# Patient Record
Sex: FEMALE | Race: BLACK OR AFRICAN AMERICAN | Employment: UNEMPLOYED | URBAN - METROPOLITAN AREA
[De-identification: names, ages, dates, MRNs, and addresses within clinical notes are randomized per-mention and may not be internally consistent; named-entity substitution may affect disease eponyms.]

---

## 2017-12-04 ENCOUNTER — OFFICE VISIT (OUTPATIENT)
Dept: FAMILY MEDICINE CLINIC | Age: 52
End: 2017-12-04

## 2017-12-04 VITALS
TEMPERATURE: 97.9 F | DIASTOLIC BLOOD PRESSURE: 83 MMHG | SYSTOLIC BLOOD PRESSURE: 132 MMHG | HEIGHT: 70 IN | OXYGEN SATURATION: 99 % | RESPIRATION RATE: 16 BRPM | BODY MASS INDEX: 32.61 KG/M2 | HEART RATE: 80 BPM | WEIGHT: 227.8 LBS

## 2017-12-04 DIAGNOSIS — E66.9 OBESITY (BMI 30-39.9): ICD-10-CM

## 2017-12-04 DIAGNOSIS — Z13.0 SCREENING FOR ENDOCRINE, METABOLIC AND IMMUNITY DISORDER: ICD-10-CM

## 2017-12-04 DIAGNOSIS — Z12.11 SCREEN FOR COLON CANCER: ICD-10-CM

## 2017-12-04 DIAGNOSIS — Z13.29 SCREENING FOR ENDOCRINE, METABOLIC AND IMMUNITY DISORDER: ICD-10-CM

## 2017-12-04 DIAGNOSIS — K21.9 GASTROESOPHAGEAL REFLUX DISEASE WITHOUT ESOPHAGITIS: ICD-10-CM

## 2017-12-04 DIAGNOSIS — Z13.228 SCREENING FOR ENDOCRINE, METABOLIC AND IMMUNITY DISORDER: ICD-10-CM

## 2017-12-04 DIAGNOSIS — F51.01 PRIMARY INSOMNIA: ICD-10-CM

## 2017-12-04 DIAGNOSIS — D50.0 IRON DEFICIENCY ANEMIA DUE TO CHRONIC BLOOD LOSS: Primary | ICD-10-CM

## 2017-12-04 RX ORDER — ESOMEPRAZOLE MAGNESIUM 20 MG/1
20 TABLET, DELAYED RELEASE ORAL DAILY
Qty: 30 TAB | Refills: 3 | Status: SHIPPED | OUTPATIENT
Start: 2017-12-04

## 2017-12-04 NOTE — LETTER
12/8/2017 3:58 PM 
 
Ms. Akua Morrissey Lisa [address] [facility] [address] Dear Jose Stubbs: 
 
Please find your most recent results below. Resulted Orders CBC W/O DIFF Result Value Ref Range WBC 9.9 3.4 - 10.8 x10E3/uL  
 RBC 4.77 3.77 - 5.28 x10E6/uL HGB 9.7 (L) 11.1 - 15.9 g/dL Comment: **Please note reference interval change** HCT 33.0 (L) 34.0 - 46.6 % MCV 69 (L) 79 - 97 fL  
 MCH 20.3 (L) 26.6 - 33.0 pg  
 MCHC 29.4 (L) 31.5 - 35.7 g/dL  
 RDW 19.5 (H) 12.3 - 15.4 % PLATELET 465 (H) 752 - 379 x10E3/uL Narrative Performed at:  [address]  [id number] : Ramos Alexander MD, Phone:  [phone] METABOLIC PANEL, COMPREHENSIVE Result Value Ref Range Glucose 84 65 - 99 mg/dL BUN 14 6 - 24 mg/dL Creatinine 0.69 0.57 - 1.00 mg/dL GFR est non- >59 mL/min/1.73 GFR est  >59 mL/min/1.73  
 BUN/Creatinine ratio 20 9 - 23 Sodium 140 134 - 144 mmol/L Potassium 4.4 3.5 - 5.2 mmol/L Chloride 104 96 - 106 mmol/L  
 CO2 21 18 - 29 mmol/L Calcium 9.4 8.7 - 10.2 mg/dL Protein, total 7.9 6.0 - 8.5 g/dL Albumin 4.5 3.5 - 5.5 g/dL GLOBULIN, TOTAL 3.4 1.5 - 4.5 g/dL A-G Ratio 1.3 1.2 - 2.2 Bilirubin, total <0.2 0.0 - 1.2 mg/dL Alk. phosphatase 139 (H) 39 - 117 IU/L  
 AST (SGOT) 14 0 - 40 IU/L  
 ALT (SGPT) 15 0 - 32 IU/L Narrative Performed at:  [address]  [id number] : Ramos Alexander MD, Phone:  [phone] LIPID PANEL Result Value Ref Range Cholesterol, total 242 (H) 100 - 199 mg/dL Triglyceride 166 (H) 0 - 149 mg/dL HDL Cholesterol 43 >39 mg/dL VLDL, calculated 33 5 - 40 mg/dL LDL, calculated 166 (H) 0 - 99 mg/dL Narrative Performed at:  [address]  [id number] : Ramos Alexander MD, Phone:  [phone] TSH AND FREE T4 Result Value Ref Range TSH 1.210 0.450 - 4.500 uIU/mL T4, Free 1.04 0.82 - 1.77 ng/dL Narrative Performed at:  [address]  [id number] : Darletta Dancer MD, Phone:  [phone] HEMOGLOBIN A1C W/O EAG Result Value Ref Range Hemoglobin A1c 5.5 4.8 - 5.6 % Comment:  
            Pre-diabetes: 5.7 - 6.4 Diabetes: >6.4 Glycemic control for adults with diabetes: <7.0 Narrative Performed at:  [address]  [id number] : Darletta Dancer MD, Phone:  [phone] IRON PROFILE Result Value Ref Range TIBC 474 (H) 250 - 450 ug/dL UIBC 448 (H) 131 - 425 ug/dL Iron 26 (L) 27 - 159 ug/dL Iron % saturation 5 (LL) 15 - 55 % Narrative Performed at:  [address]  [id number] : Darletta Dancer MD, Phone:  [phone] CVD REPORT Result Value Ref Range INTERPRETATION Note Comment:  
   Supplemental report is available. Narrative Performed at:  [address] [place] [address]  [id number] : Garret Keith PhD, Phone:  [phone] Patient's blood cells indicate she has iron deficiency anemia. She needs to follow up with hematology to discuss iron infusions. Patient still needs to follow up with Gastro. Thyroid levels, glucose, liver and kidneys were normal. The test for diabetes (Hemoglobin A1c) was normal.  
Total cholesterol, triglycerides and LDL (bad cholesterol) were elevated. I recommend a low fat diet (avoiding fried and fast food, red meat, etc) as well as regular exercise. I recommend rechecking your cholesterol in 6 months Please call me if you have any questions: [phone] Sincerely, Ernie Asif, NP

## 2017-12-04 NOTE — MR AVS SNAPSHOT
Visit Information Date & Time Provider Department Dept. Phone Encounter #  
 12/4/2017 11:00 AM Merle Henley  [facility] [phone] [id number] Follow-up Instructions Return in about 6 months (around 6/4/2018). Upcoming Health Maintenance Date Due DTaP/Tdap/Td series (1 - Tdap) 7/4/1988 FOBT Q 1 YEAR AGE 50-75 7/4/2017 BREAST CANCER SCRN MAMMOGRAM 4/11/2018* PAP AKA CERVICAL CYTOLOGY 5/16/2018* *Topic was postponed. The date shown is not the original due date. Allergies as of 12/4/2017  Review Complete On: 12/4/2017 By: Melody Mohr LPN Severity Noted Reaction Type Reactions Midol Menstrual Headache [Acetaminophen-caffeine] High 12/04/2017   Side Effect Swelling Shellfish Derived Medium 12/04/2017   Systemic Itching Current Immunizations  Never Reviewed No immunizations on file. Not reviewed this visit You Were Diagnosed With   
  
 Codes Comments Iron deficiency anemia due to chronic blood loss    -  Primary ICD-10-CM: D50.0 ICD-9-CM: 280.0 Gastroesophageal reflux disease without esophagitis     ICD-10-CM: K21.9 ICD-9-CM: 530.81 Obesity (BMI 30-39. 9)     ICD-10-CM: E66.9 ICD-9-CM: 278.00 Screening for endocrine, metabolic and immunity disorder     ICD-10-CM: Z13.29, Z13.228, Z13.0 ICD-9-CM: V77.99 Vitals BP Pulse Temp Resp Height(growth percentile) Weight(growth percentile) 132/83 (BP 1 Location: Right arm, BP Patient Position: Sitting) 80 97.9 °F (36.6 °C) (Oral) 16 5' 10\" (1.778 m) 227 lb 12.8 oz (103.3 kg) LMP SpO2 BMI OB Status Smoking Status 11/26/2017 (Approximate) 99% 32.69 kg/m2 Having regular periods Never Smoker Vitals History BMI and BSA Data Body Mass Index Body Surface Area  
 32.69 kg/m 2 2.26 m 2 Preferred Pharmacy Pharmacy Name Phone  [address] AT [address] [place] [address] [address] [facility] [address] [address] [phone] Your Updated Medication List  
  
   
This list is accurate as of: 12/4/17 11:32 AM.  Always use your most recent med list.  
  
  
  
  
 esomeprazole magnesium 20 mg Tbec Commonly known as:  NexIUM 24HR Take 20 mg by mouth daily. For acid reflux Prescriptions Sent to Pharmacy Refills  
 esomeprazole magnesium (NEXIUM 24HR) 20 mg TbEC 3 Sig: Take 20 mg by mouth daily. For acid reflux Class: Normal  
 Pharmacy: [facility] [address] [place] [address], [address] [facility] of [address] [place] [address] Ph #: [phone] Route: Oral  
  
We Performed the Following CBC W/O DIFF [05490 CPT(R)] HEMOGLOBIN A1C W/O EAG [59245 CPT(R)] IRON PROFILE [id number] CPT(R)] LIPID PANEL [00320 CPT(R)] METABOLIC PANEL, COMPREHENSIVE [09463 CPT(R)] TSH AND FREE T4 [88770 CPT(R)] Follow-up Instructions Return in about 6 months (around 6/4/2018). Introducing [facility]! Olimpia Vaughan introduces [employer] patient portal. Now you can access parts of your medical record, email your doctor's office, and request medication refills online. 1. In your internet browser, go to [email or web address] 2. Click on the First Time User? Click Here link in the Sign In box. You will see the New Member Sign Up page. 3. Enter your [employer] Access Code exactly as it appears below. You will not need to use this code after youve completed the sign-up process. If you do not sign up before the expiration date, you must request a new code. · [employer] Access Code: [email or web address] Expires: 3/4/2018 11:32 AM 
 
4. Enter the last four digits of your Social Security Number (xxxx) and Date of Birth (mm/dd/yyyy) as indicated and click Submit. You will be taken to the next sign-up page. 5. Create a [employer] ID.  This will be your [employer] login ID and cannot be changed, so think of one that is secure and easy to remember. 6. Create a LookUP password. You can change your password at any time. 7. Enter your Password Reset Question and Answer. This can be used at a later time if you forget your password. 8. Enter your e-mail address. You will receive e-mail notification when new information is available in [address] 9. Click Sign Up. You can now view and download portions of your medical record. 10. Click the Download Summary menu link to download a portable copy of your medical information. If you have questions, please visit the Frequently Asked Questions section of the LookUP website. Remember, LookUP is NOT to be used for urgent needs. For medical emergencies, dial 911. Now available from your iPhone and Android! Please provide this summary of care documentation to your next provider. Your primary care clinician is listed as Amada Painter. If you have any questions after today's visit, please call [phone].

## 2017-12-04 NOTE — PROGRESS NOTES
Salbador Antonio is a 50 y.o. female who was seen in clinic today (12/4/2017). Subjective:  Cardiovascular Review:  The patient has no known cardiovascular conditions. Diet and Lifestyle: generally follows a low fat low cholesterol diet, generally follows a low sodium diet, exercises sporadically, nonsmoker  Home BP Monitoring: is not measured at home. Pertinent ROS: no TIA's, no chest pain on exertion, no dyspnea on exertion, no swelling of ankles. Patient seen by hematology for iron deficiency anemia s/t menorrhagia. Taking iron supplement daily. Patient seen by GYN routinely. Nausea / Vomiting  Patient complains of nausea without vomiting. Onset of symptoms was 1 month ago. Patient describes nausea as moderate. Insominia  Patient reports presents with difficulty sleeping. Onset: several years ago. Description of symtoms:  frequent awakening. Associated symptoms: hot flashes. Denies: depression, anxiety and ETOH overuse. Treatment to date: none. Prior to Admission medications    Medication Sig Start Date End Date Taking? Authorizing Provider   esomeprazole magnesium (NEXIUM 24HR) 20 mg TbEC Take 20 mg by mouth daily. For acid reflux 12/4/17  Yes Merle Henley NP          Allergies   Allergen Reactions    Midol Menstrual Headache [Acetaminophen-Caffeine] Swelling    Shellfish Derived Itching           ROS  See HPI    Objective:   Physical Exam   Constitutional: She is oriented to person, place, and time. She appears well-developed and well-nourished. Neck: Normal range of motion. Neck supple. No JVD present. Carotid bruit is not present. No thyromegaly present. Cardiovascular: Normal rate, regular rhythm and intact distal pulses. Exam reveals no gallop and no friction rub. No murmur heard. Pulmonary/Chest: Effort normal and breath sounds normal. No respiratory distress. Musculoskeletal: She exhibits no edema. Lymphadenopathy:     She has no cervical adenopathy. Neurological: She is alert and oriented to person, place, and time. Psychiatric: She has a normal mood and affect. Her behavior is normal.   Nursing note and vitals reviewed. Visit Vitals    /83 (BP 1 Location: Right arm, BP Patient Position: Sitting)    Pulse 80    Temp 97.9 °F (36.6 °C) (Oral)    Resp 16    Ht 5' 10\" (1.778 m)    Wt 227 lb 12.8 oz (103.3 kg)    LMP 11/26/2017 (Approximate)    SpO2 99%    BMI 32.69 kg/m2       Assessment & Plan:  Diagnoses and all orders for this visit:    1. Iron deficiency anemia due to chronic blood loss  Recheck labs  -     CBC W/O DIFF  -     IRON PROFILE    2. Gastroesophageal reflux disease without esophagitis  -     Begin esomeprazole magnesium (NEXIUM 24HR) 20 mg TbEC; Take 20 mg by mouth daily. For acid reflux    3. Obesity (BMI 30-39. 9)  Discussed need for weight loss through diet and exercise. Reviewed decreased caloric intake and increased activity. 4. Primary insomnia  Melatonin PRN    5. Screening for endocrine, metabolic and immunity disorder  -     CBC W/O DIFF  -     METABOLIC PANEL, COMPREHENSIVE  -     LIPID PANEL  -     TSH AND FREE T4  -     HEMOGLOBIN A1C W/O EAG  -     IRON PROFILE    6. Screen for colon cancer  Discussed need for colonoscopy as a screening for colon cancer.   -     REFERRAL TO GASTROENTEROLOGY      I have discussed the diagnosis with the patient and the intended plan as seen in the above orders. The patient has received an after-visit summary along with patient information handout. I have discussed medication side effects and warnings with the patient as well. Follow-up Disposition:  Return in about 6 months (around 6/4/2018).         Manisha Rodriguez NP

## 2017-12-04 NOTE — PROGRESS NOTES
Chief Complaint   Patient presents with    New Patient    Nausea     on and off for about a month    Weight Gain     patient states she has concerns about her weight gain over the past 2 months       1. Have you been to the ER, urgent care clinic since your last visit? Hospitalized since your last visit? No    2. Have you seen or consulted any other health care providers outside of the [address] [place] [address] since your last visit? Include any pap smears or colon screening.  No

## 2017-12-05 LAB
ALBUMIN SERPL-MCNC: 4.5 G/DL (ref 3.5–5.5)
ALBUMIN/GLOB SERPL: 1.3 {RATIO} (ref 1.2–2.2)
ALP SERPL-CCNC: 139 IU/L (ref 39–117)
ALT SERPL-CCNC: 15 IU/L (ref 0–32)
AST SERPL-CCNC: 14 IU/L (ref 0–40)
BILIRUB SERPL-MCNC: <0.2 MG/DL (ref 0–1.2)
BUN SERPL-MCNC: 14 MG/DL (ref 6–24)
BUN/CREAT SERPL: 20 (ref 9–23)
CALCIUM SERPL-MCNC: 9.4 MG/DL (ref 8.7–10.2)
CHLORIDE SERPL-SCNC: 104 MMOL/L (ref 96–106)
CHOLEST SERPL-MCNC: 242 MG/DL (ref 100–199)
CO2 SERPL-SCNC: 21 MMOL/L (ref 18–29)
CREAT SERPL-MCNC: 0.69 MG/DL (ref 0.57–1)
ERYTHROCYTE [DISTWIDTH] IN BLOOD BY AUTOMATED COUNT: 19.5 % (ref 12.3–15.4)
GFR SERPLBLD CREATININE-BSD FMLA CKD-EPI: 102 ML/MIN/1.73
GFR SERPLBLD CREATININE-BSD FMLA CKD-EPI: 117 ML/MIN/1.73
GLOBULIN SER CALC-MCNC: 3.4 G/DL (ref 1.5–4.5)
GLUCOSE SERPL-MCNC: 84 MG/DL (ref 65–99)
HBA1C MFR BLD: 5.5 % (ref 4.8–5.6)
HCT VFR BLD AUTO: 33 % (ref 34–46.6)
HDLC SERPL-MCNC: 43 MG/DL
HGB BLD-MCNC: 9.7 G/DL (ref 11.1–15.9)
INTERPRETATION, 910389: NORMAL
IRON SATN MFR SERPL: 5 % (ref 15–55)
IRON SERPL-MCNC: 26 UG/DL (ref 27–159)
LDLC SERPL CALC-MCNC: 166 MG/DL (ref 0–99)
MCH RBC QN AUTO: 20.3 PG (ref 26.6–33)
MCHC RBC AUTO-ENTMCNC: 29.4 G/DL (ref 31.5–35.7)
MCV RBC AUTO: 69 FL (ref 79–97)
PLATELET # BLD AUTO: 456 X10E3/UL (ref 150–379)
POTASSIUM SERPL-SCNC: 4.4 MMOL/L (ref 3.5–5.2)
PROT SERPL-MCNC: 7.9 G/DL (ref 6–8.5)
RBC # BLD AUTO: 4.77 X10E6/UL (ref 3.77–5.28)
SODIUM SERPL-SCNC: 140 MMOL/L (ref 134–144)
T4 FREE SERPL-MCNC: 1.04 NG/DL (ref 0.82–1.77)
TIBC SERPL-MCNC: 474 UG/DL (ref 250–450)
TRIGL SERPL-MCNC: 166 MG/DL (ref 0–149)
TSH SERPL DL<=0.005 MIU/L-ACNC: 1.21 UIU/ML (ref 0.45–4.5)
UIBC SERPL-MCNC: 448 UG/DL (ref 131–425)
VLDLC SERPL CALC-MCNC: 33 MG/DL (ref 5–40)
WBC # BLD AUTO: 9.9 X10E3/UL (ref 3.4–10.8)

## 2017-12-08 DIAGNOSIS — D50.9 IRON DEFICIENCY ANEMIA, UNSPECIFIED IRON DEFICIENCY ANEMIA TYPE: Primary | ICD-10-CM

## 2017-12-08 NOTE — PROGRESS NOTES
Correction: patient already taking iron supplement. She needs to follow up with hematology to discuss iron infusions.  Patient still needs to follow up with GI.

## 2017-12-08 NOTE — PROGRESS NOTES
Patient will call Concetta Latif to make appointment mail referral to her as well per patient request

## 2017-12-08 NOTE — PROGRESS NOTES
[phone] verified  Patient notified of above note and voiced understanding of what was read.   Patient requested labs to be mailed to her

## 2017-12-08 NOTE — PROGRESS NOTES
Patient's blood cells indicate she has iron deficiency anemia. She needs to start on an iron supplement once daily -this will be sent to her pharmacy. Also needs to follow up with GI to ensure she does not have a GI bleed. Thyroid levels, glucose, liver and kidneys were normal. The test for diabetes (Hemoglobin A1c) was normal.     Total cholesterol, triglycerides and LDL (bad cholesterol) were elevated. I recommend a low fat diet (avoiding fried and fast food, red meat, etc) as well as regular exercise. I recommend rechecking your cholesterol in 6 months.

## 2018-10-26 ENCOUNTER — OFFICE VISIT (OUTPATIENT)
Dept: FAMILY MEDICINE CLINIC | Age: 53
End: 2018-10-26

## 2018-10-26 VITALS
DIASTOLIC BLOOD PRESSURE: 80 MMHG | WEIGHT: 223 LBS | HEIGHT: 70 IN | BODY MASS INDEX: 31.92 KG/M2 | RESPIRATION RATE: 18 BRPM | OXYGEN SATURATION: 99 % | SYSTOLIC BLOOD PRESSURE: 133 MMHG | HEART RATE: 90 BPM | TEMPERATURE: 97.7 F

## 2018-10-26 DIAGNOSIS — E78.5 HYPERLIPIDEMIA, UNSPECIFIED HYPERLIPIDEMIA TYPE: ICD-10-CM

## 2018-10-26 DIAGNOSIS — E66.9 OBESITY (BMI 30-39.9): ICD-10-CM

## 2018-10-26 DIAGNOSIS — Z12.11 SCREEN FOR COLON CANCER: ICD-10-CM

## 2018-10-26 DIAGNOSIS — Z00.00 ROUTINE GENERAL MEDICAL EXAMINATION AT A HEALTH CARE FACILITY: Primary | ICD-10-CM

## 2018-10-26 DIAGNOSIS — N92.0 MENORRHAGIA WITH REGULAR CYCLE: ICD-10-CM

## 2018-10-26 DIAGNOSIS — D50.0 IRON DEFICIENCY ANEMIA DUE TO CHRONIC BLOOD LOSS: ICD-10-CM

## 2018-10-26 RX ORDER — LANOLIN ALCOHOL/MO/W.PET/CERES
CREAM (GRAM) TOPICAL
COMMUNITY

## 2018-10-26 NOTE — PATIENT INSTRUCTIONS
Abnormal Uterine Bleeding: Care Instructions  Your Care Instructions    Abnormal uterine bleeding (AUB) is irregular bleeding from the uterus that is longer or heavier than usual or does not occur at your regular time. Sometimes it is caused by changes in hormone levels. It can also be caused by growths in the uterus, such as fibroids or polyps. Sometimes a cause cannot be found. You may have heavy bleeding when you are not expecting your period. Your doctor may suggest a pregnancy test, if you think you are pregnant. Follow-up care is a key part of your treatment and safety. Be sure to make and go to all appointments, and call your doctor if you are having problems. It's also a good idea to know your test results and keep a list of the medicines you take. How can you care for yourself at home? · Be safe with medicines. Take pain medicines exactly as directed. ? If the doctor gave you a prescription medicine for pain, take it as prescribed. ? If you are not taking a prescription pain medicine, ask your doctor if you can take an over-the-counter medicine. · You may be low in iron because of blood loss. Eat a balanced diet that is high in iron and vitamin C. Foods rich in iron include red meat, shellfish, eggs, beans, and leafy green vegetables. Talk to your doctor about whether you need to take iron pills or a multivitamin. When should you call for help? Call 911 anytime you think you may need emergency care. For example, call if:    · You passed out (lost consciousness).    Call your doctor now or seek immediate medical care if:    · You have new or worse belly or pelvic pain.     · You have severe vaginal bleeding.     · You feel dizzy or lightheaded, or you feel like you may faint.    Watch closely for changes in your health, and be sure to contact your doctor if:    · You think you may be pregnant.     · Your bleeding gets worse.     · You do not get better as expected.    Where can you learn more?  Go to http://deo-deb.info/. Enter U040 in the search box to learn more about \"Abnormal Uterine Bleeding: Care Instructions. \"  Current as of: May 15, 2018  Content Version: 11.8  © 3554-2459 [employer]. Care instructions adapted under license by [employer] (which disclaims liability or warranty for this information). If you have questions about a medical condition or this instruction, always ask your healthcare professional. [address] any warranty or liability for your use of this information.

## 2018-10-26 NOTE — PROGRESS NOTES
Chief Complaint   Patient presents with    Establish Care    Complete Physical    Irregular Menses     Heavy flow and last longer     1. Have you been to the ER, urgent care clinic since your last visit? Hospitalized since your last visit? No    2. Have you seen or consulted any other health care providers outside of the [address] [place] [address] since your last visit? Include any pap smears or colon screening.  No

## 2018-10-26 NOTE — PROGRESS NOTES
[facility] Note    Zbigniew Castellano is a 48 y.o. female who was seen in clinic today (10/26/2018). Subjective:  Cardiovascular Review:  The patient has obesity and hyperlipidemia. Diet and Lifestyle: generally follows a low fat low cholesterol diet, generally follows a low sodium diet, sedentary, nonsmoker  Home BP Monitoring: is not measured at home. Pertinent ROS: no TIA's, no chest pain on exertion, no dyspnea on exertion, no swelling of ankles. Menorrhagia  Patient complains of irregular menses. She had been bleeding regularly. Menses are lasting up to 8-9 days. Pad or tampon count: changes every 2 hours. May experience clots  Dysmenorrhea: none. She denies h/o fibroid tumors. Previously seen by [facility]. Taking iron supplement every other day - reports constipation for medication. Prior to Admission medications    Medication Sig Start Date End Date Taking? Authorizing Provider   ferrous sulfate (IRON) 325 mg (65 mg iron) tablet Take  by mouth Daily (before breakfast). Yes Provider, Historical   esomeprazole magnesium (NEXIUM 24HR) 20 mg TbEC Take 20 mg by mouth daily. For acid reflux 12/4/17  Yes Jorge Barboza Sender, NP          Allergies   Allergen Reactions    Midol Menstrual Headache [Acetaminophen-Caffeine] Swelling    Shellfish Derived Itching           Review of Systems   Constitutional: Negative for malaise/fatigue and weight loss. HENT: Negative for hearing loss. Eyes: Negative for blurred vision. Respiratory: Negative for shortness of breath. Cardiovascular: Negative for chest pain, palpitations and leg swelling. Gastrointestinal: Negative for abdominal pain, constipation, diarrhea and heartburn. Genitourinary: Negative for frequency and urgency. Musculoskeletal: Negative for back pain, joint pain and myalgias. Neurological: Negative for dizziness, weakness and headaches.    Endo/Heme/Allergies: Does not bruise/bleed easily. Psychiatric/Behavioral: Negative for depression. Objective:   Physical Exam   Constitutional: She is oriented to person, place, and time. She appears well-developed and well-nourished. No distress. HENT:   Right Ear: Tympanic membrane and ear canal normal.   Left Ear: Tympanic membrane and ear canal normal.   Nose: No mucosal edema. Right sinus exhibits no maxillary sinus tenderness and no frontal sinus tenderness. Left sinus exhibits no maxillary sinus tenderness and no frontal sinus tenderness. Mouth/Throat: Oropharynx is clear and moist.   Eyes: Conjunctivae and EOM are normal. Pupils are equal, round, and reactive to light. Neck: Normal range of motion. Neck supple. No JVD present. Carotid bruit is not present. No thyromegaly present. Cardiovascular: Normal rate, regular rhythm, normal heart sounds and intact distal pulses. Exam reveals no gallop and no friction rub. No murmur heard. Pulmonary/Chest: Effort normal and breath sounds normal. No respiratory distress. She has no decreased breath sounds. She has no wheezes. She has no rhonchi. Abdominal: Soft. Bowel sounds are normal. She exhibits no distension. There is no tenderness. Musculoskeletal: She exhibits no edema. Lymphadenopathy:     She has no cervical adenopathy. Neurological: She is alert and oriented to person, place, and time. Psychiatric: She has a normal mood and affect. Her behavior is normal.   Nursing note and vitals reviewed. Visit Vitals  /80 (BP 1 Location: Left arm, BP Patient Position: Sitting)   Pulse 90   Temp 97.7 °F (36.5 °C) (Oral)   Resp 18   Ht 5' 10\" (1.778 m)   Wt 223 lb (101.2 kg)   LMP 09/26/2018 (Approximate)   SpO2 99%   BMI 32.00 kg/m²       Assessment & Plan:  Diagnoses and all orders for this visit:    1. Routine general medical examination at a health care facility    2. Iron deficiency anemia due to chronic blood loss  Recheck levels.  Referral to hematology for parental iron as needed. -     ANEMIA PROFILE B    3. Hyperlipidemia, unspecified hyperlipidemia type  Reviewed diet and lifestyle changes.  -     METABOLIC PANEL, COMPREHENSIVE  -     LIPID PANEL    4. Obesity (BMI 30-39. 9)  Discussed need for weight loss through diet and exercise. Reviewed decreased caloric intake and increased activity. 5. Menorrhagia with regular cycle  Likely had uterine fibroids. Follow up needed with GYN for definitive treatment plan. -     ANEMIA PROFILE B  -     REFERRAL TO OBSTETRICS AND GYNECOLOGY    6. Screen for colon cancer  Discussed need for colonoscopy as a screening for colon cancer.   -     REFERRAL TO GASTROENTEROLOGY      I have discussed the diagnosis with the patient and the intended plan as seen in the above orders. The patient has received an after-visit summary along with patient information handout. I have discussed medication side effects and warnings with the patient as well. Follow-up Disposition:  Return in about 6 months (around 4/26/2019) for disease management.         Diomedes Carl NP

## 2018-10-27 DIAGNOSIS — D50.0 IRON DEFICIENCY ANEMIA DUE TO CHRONIC BLOOD LOSS: Primary | ICD-10-CM

## 2018-10-27 LAB
ALBUMIN SERPL-MCNC: 4.3 G/DL (ref 3.5–5.5)
ALBUMIN/GLOB SERPL: 1.3 {RATIO} (ref 1.2–2.2)
ALP SERPL-CCNC: 175 IU/L (ref 39–117)
ALT SERPL-CCNC: 29 IU/L (ref 0–32)
AST SERPL-CCNC: 24 IU/L (ref 0–40)
BASOPHILS # BLD AUTO: 0 X10E3/UL (ref 0–0.2)
BASOPHILS NFR BLD AUTO: 0 %
BILIRUB SERPL-MCNC: 0.2 MG/DL (ref 0–1.2)
BUN SERPL-MCNC: 15 MG/DL (ref 6–24)
BUN/CREAT SERPL: 22 (ref 9–23)
CALCIUM SERPL-MCNC: 9.5 MG/DL (ref 8.7–10.2)
CHLORIDE SERPL-SCNC: 103 MMOL/L (ref 96–106)
CHOLEST SERPL-MCNC: 207 MG/DL (ref 100–199)
CO2 SERPL-SCNC: 23 MMOL/L (ref 20–29)
CREAT SERPL-MCNC: 0.67 MG/DL (ref 0.57–1)
EOSINOPHIL # BLD AUTO: 0.1 X10E3/UL (ref 0–0.4)
EOSINOPHIL NFR BLD AUTO: 2 %
ERYTHROCYTE [DISTWIDTH] IN BLOOD BY AUTOMATED COUNT: 18.8 % (ref 12.3–15.4)
FERRITIN SERPL-MCNC: 8 NG/ML (ref 15–150)
FOLATE SERPL-MCNC: 13.9 NG/ML
GLOBULIN SER CALC-MCNC: 3.3 G/DL (ref 1.5–4.5)
GLUCOSE SERPL-MCNC: 92 MG/DL (ref 65–99)
HCT VFR BLD AUTO: 30.9 % (ref 34–46.6)
HDLC SERPL-MCNC: 39 MG/DL
HGB BLD-MCNC: 9.1 G/DL (ref 11.1–15.9)
IMM GRANULOCYTES # BLD: 0 X10E3/UL (ref 0–0.1)
IMM GRANULOCYTES NFR BLD: 0 %
INTERPRETATION, 910389: NORMAL
IRON SATN MFR SERPL: 8 % (ref 15–55)
IRON SERPL-MCNC: 31 UG/DL (ref 27–159)
LDLC SERPL CALC-MCNC: 137 MG/DL (ref 0–99)
LYMPHOCYTES # BLD AUTO: 2 X10E3/UL (ref 0.7–3.1)
LYMPHOCYTES NFR BLD AUTO: 26 %
MCH RBC QN AUTO: 20.5 PG (ref 26.6–33)
MCHC RBC AUTO-ENTMCNC: 29.4 G/DL (ref 31.5–35.7)
MCV RBC AUTO: 70 FL (ref 79–97)
MONOCYTES # BLD AUTO: 0.4 X10E3/UL (ref 0.1–0.9)
MONOCYTES NFR BLD AUTO: 5 %
NEUTROPHILS # BLD AUTO: 5.3 X10E3/UL (ref 1.4–7)
NEUTROPHILS NFR BLD AUTO: 67 %
PLATELET # BLD AUTO: 416 X10E3/UL (ref 150–379)
POTASSIUM SERPL-SCNC: 4.1 MMOL/L (ref 3.5–5.2)
PROT SERPL-MCNC: 7.6 G/DL (ref 6–8.5)
RBC # BLD AUTO: 4.43 X10E6/UL (ref 3.77–5.28)
RETICS/RBC NFR AUTO: 2 % (ref 0.6–2.6)
SODIUM SERPL-SCNC: 139 MMOL/L (ref 134–144)
TIBC SERPL-MCNC: 409 UG/DL (ref 250–450)
TRIGL SERPL-MCNC: 155 MG/DL (ref 0–149)
UIBC SERPL-MCNC: 378 UG/DL (ref 131–425)
VIT B12 SERPL-MCNC: 696 PG/ML (ref 232–1245)
VLDLC SERPL CALC-MCNC: 31 MG/DL (ref 5–40)
WBC # BLD AUTO: 7.9 X10E3/UL (ref 3.4–10.8)

## 2018-10-27 NOTE — PROGRESS NOTES
Patient is still anemic with low iron levels. I have placed a referral to hematology. She needs IV iron to get her iron stores up. Also needs to follow up with GYN. Her glucose, liver and kidneys were normal.     Her total cholesterol, triglycerides and LDL (bad cholesterol) were elevated. I recommend a low fat diet (avoiding fried and fast food, red meat, etc) as well as regular exercise.

## 2018-10-29 NOTE — PROGRESS NOTES
[phone] verified  notified patient of her lab results and understand per patient wants us to make appointment for Dr Radha Oreilly send referral to [facility] to set up appointment

## 2018-11-01 ENCOUNTER — OFFICE VISIT (OUTPATIENT)
Dept: ONCOLOGY | Age: 53
End: 2018-11-01

## 2018-11-01 VITALS
HEIGHT: 70 IN | BODY MASS INDEX: 34.32 KG/M2 | WEIGHT: 239.7 LBS | RESPIRATION RATE: 20 BRPM | HEART RATE: 85 BPM | DIASTOLIC BLOOD PRESSURE: 84 MMHG | SYSTOLIC BLOOD PRESSURE: 136 MMHG | TEMPERATURE: 97.9 F | OXYGEN SATURATION: 98 %

## 2018-11-01 DIAGNOSIS — E66.9 OBESITY (BMI 30-39.9): ICD-10-CM

## 2018-11-01 DIAGNOSIS — D50.0 IRON DEFICIENCY ANEMIA DUE TO CHRONIC BLOOD LOSS: Primary | ICD-10-CM

## 2018-11-01 RX ORDER — SODIUM CHLORIDE 9 MG/ML
10 INJECTION INTRAMUSCULAR; INTRAVENOUS; SUBCUTANEOUS AS NEEDED
Status: CANCELLED | OUTPATIENT
Start: 2018-11-15

## 2018-11-01 RX ORDER — DIPHENHYDRAMINE HYDROCHLORIDE 50 MG/ML
50 INJECTION, SOLUTION INTRAMUSCULAR; INTRAVENOUS AS NEEDED
Status: CANCELLED
Start: 2018-11-15

## 2018-11-01 RX ORDER — HEPARIN 100 UNIT/ML
300-500 SYRINGE INTRAVENOUS AS NEEDED
Status: CANCELLED
Start: 2018-11-15

## 2018-11-01 RX ORDER — DIPHENHYDRAMINE HYDROCHLORIDE 50 MG/ML
50 INJECTION, SOLUTION INTRAMUSCULAR; INTRAVENOUS AS NEEDED
Status: CANCELLED
Start: 2018-11-08

## 2018-11-01 RX ORDER — HEPARIN 100 UNIT/ML
300-500 SYRINGE INTRAVENOUS AS NEEDED
Status: CANCELLED
Start: 2018-11-08

## 2018-11-01 RX ORDER — EPINEPHRINE 1 MG/ML
0.3 INJECTION, SOLUTION, CONCENTRATE INTRAVENOUS AS NEEDED
Status: CANCELLED | OUTPATIENT
Start: 2018-11-15

## 2018-11-01 RX ORDER — SODIUM CHLORIDE 0.9 % (FLUSH) 0.9 %
10 SYRINGE (ML) INJECTION AS NEEDED
Status: CANCELLED
Start: 2018-11-08

## 2018-11-01 RX ORDER — HYDROCORTISONE SODIUM SUCCINATE 100 MG/2ML
100 INJECTION, POWDER, FOR SOLUTION INTRAMUSCULAR; INTRAVENOUS AS NEEDED
Status: CANCELLED | OUTPATIENT
Start: 2018-11-08

## 2018-11-01 RX ORDER — EPINEPHRINE 1 MG/ML
0.3 INJECTION, SOLUTION, CONCENTRATE INTRAVENOUS AS NEEDED
Status: CANCELLED | OUTPATIENT
Start: 2018-11-08

## 2018-11-01 RX ORDER — ALBUTEROL SULFATE 0.83 MG/ML
2.5 SOLUTION RESPIRATORY (INHALATION) AS NEEDED
Status: CANCELLED
Start: 2018-11-15

## 2018-11-01 RX ORDER — HYDROCORTISONE SODIUM SUCCINATE 100 MG/2ML
100 INJECTION, POWDER, FOR SOLUTION INTRAMUSCULAR; INTRAVENOUS AS NEEDED
Status: CANCELLED | OUTPATIENT
Start: 2018-11-15

## 2018-11-01 RX ORDER — ALBUTEROL SULFATE 0.83 MG/ML
2.5 SOLUTION RESPIRATORY (INHALATION) AS NEEDED
Status: CANCELLED
Start: 2018-11-08

## 2018-11-01 RX ORDER — SODIUM CHLORIDE 0.9 % (FLUSH) 0.9 %
10 SYRINGE (ML) INJECTION AS NEEDED
Status: CANCELLED
Start: 2018-11-15

## 2018-11-01 RX ORDER — SODIUM CHLORIDE 9 MG/ML
10 INJECTION INTRAMUSCULAR; INTRAVENOUS; SUBCUTANEOUS AS NEEDED
Status: CANCELLED | OUTPATIENT
Start: 2018-11-08

## 2018-11-01 RX ORDER — ONDANSETRON 2 MG/ML
8 INJECTION INTRAMUSCULAR; INTRAVENOUS AS NEEDED
Status: CANCELLED | OUTPATIENT
Start: 2018-11-08

## 2018-11-01 RX ORDER — ONDANSETRON 2 MG/ML
8 INJECTION INTRAMUSCULAR; INTRAVENOUS AS NEEDED
Status: CANCELLED | OUTPATIENT
Start: 2018-11-15

## 2018-11-01 NOTE — PROGRESS NOTES
[facility] at [address] [address] [facility] [address] W: [phone]  F: [phone] for Visit:  
Glendy Moody is a 48 y.o. female who is seen in consultation at the request of Donal Celis NP for evaluation of anemia History of Present Illness:  
Patient is a 48 y.o. female with Is noted to have anemia since at least December 2017 and hemoglobin is ranged from 9.1 g/dL to 9.7 g/dL with microcytosis and MCV ranging from 69-70. She is also had mild thrombocytopenia with platelets in the 289F. No abnormalities of WBC count. Evaluation revealed iron deficiency with an iron saturation of 8% and ferritin of 8%, B12 normal at 696, no evidence of hemolysis. She now comes for further evaluation. She states that she has fatigue, she has no CP, has some SAWYER, she has dizziness. She has no craving for ice. She states that she has intermenstrual bleeding- this is light for the most part. However he does have heavy menstrual bleeding due to fibroids. She has been on iron pills and has dark stools, she has no other bleeding. She has never had a colonoscopy. She has no pain, no falls, no HA, no rashes. She is inconsistent with oral iron as it causes nausea. Her mother had breast cancer in her 46s She has never smoked and does not drink Past Medical History:  
Diagnosis Date  Iron deficiency anemia No past surgical history on file. Social History Tobacco Use  Smoking status: Never Smoker  Smokeless tobacco: Never Used Substance Use Topics  Alcohol use: Yes Comment: 2 drinks a year Family History Problem Relation Age of Onset  Cancer Mother   
     breast  
 
Current Outpatient Medications Medication Sig  esomeprazole magnesium (NEXIUM 24HR) 20 mg TbEC Take 20 mg by mouth daily. For acid reflux  ferrous sulfate (IRON) 325 mg (65 mg iron) tablet Take  by mouth Daily (before breakfast). No current facility-administered medications for this visit. Allergies Allergen Reactions  Midol Menstrual Headache [Acetaminophen-Caffeine] Swelling  Shellfish Derived Itching Review of Systems: A complete review of systems was obtained, negative except as described above. Physical Exam:  
 
Visit Vitals /84 (BP 1 Location: Right arm, BP Patient Position: Sitting) Pulse 85 Temp 97.9 °F (36.6 °C) (Oral) Resp 20 Ht 5' 10\" (1.778 m) Wt 239 lb 11.2 oz (108.7 kg) LMP 09/24/2018 (Approximate) SpO2 98% BMI 34.39 kg/m² ECOG PS: 1 General: No distress Eyes: PERRLA, anicteric sclerae, pallor is present HENT: Atraumatic, OP clear Neck: Supple Lymphatic: No cervical, supraclavicular, or inguinal adenopathy Respiratory: CTAB, normal respiratory effort CV: Normal rate, regular rhythm, no murmurs, no peripheral edema GI: Soft, nontender, nondistended, no masses, no hepatomegaly, no splenomegaly MS: Normal gait and station. Digits without clubbing or cyanosis. Skin: No rashes, ecchymoses, or petechiae. Normal temperature, turgor, and texture. Psych: Alert, oriented, appropriate affect, normal judgment/insight Results:  
 
Lab Results Component Value Date/Time WBC 7.9 10/26/2018 08:59 AM  
 HGB 9.1 (L) 10/26/2018 08:59 AM  
 HCT 30.9 (L) 10/26/2018 08:59 AM  
 PLATELET 185 (H) 84/77/8206 08:59 AM  
 MCV 70 (L) 10/26/2018 08:59 AM  
 ABS. NEUTROPHILS 5.3 10/26/2018 08:59 AM  
 
Lab Results Component Value Date/Time Sodium 139 10/26/2018 08:59 AM  
 Potassium 4.1 10/26/2018 08:59 AM  
 Chloride 103 10/26/2018 08:59 AM  
 CO2 23 10/26/2018 08:59 AM  
 Glucose 92 10/26/2018 08:59 AM  
 BUN 15 10/26/2018 08:59 AM  
 Creatinine 0.67 10/26/2018 08:59 AM  
 GFR est  10/26/2018 08:59 AM  
 GFR est non- 10/26/2018 08:59 AM  
 Calcium 9.5 10/26/2018 08:59 AM  
 
Lab Results Component Value Date/Time  Bilirubin, total 0.2 10/26/2018 08:59 AM  
 ALT (SGPT) 29 10/26/2018 08:59 AM  
 AST (SGOT) 24 10/26/2018 08:59 AM  
 Alk. phosphatase 175 (H) 10/26/2018 08:59 AM  
 Protein, total 7.6 10/26/2018 08:59 AM  
 Albumin 4.3 10/26/2018 08:59 AM  
 
Lab Results Component Value Date/Time Reticulocyte count 2.0 10/26/2018 08:59 AM  
 Iron % saturation 8 (LL) 10/26/2018 08:59 AM  
 TIBC 409 10/26/2018 08:59 AM  
 Ferritin 8 (L) 10/26/2018 08:59 AM  
 Vitamin B12 696 10/26/2018 08:59 AM  
 Folate 13.9 10/26/2018 08:59 AM  
 TSH 1.210 12/04/2017 11:38 AM  
 
No results found for: INR, APTT, DDIMSQ, DDIME, [id number], [place] Rob, [place], [address] [facility], [address], [id number], [id number], [id number], [id number], [id number], [id number] Records reviewed and summarized above. Pathology report(s) reviewed above. Radiology report(s) reviewed above. Assessment:  
1) Iron deficiency anemia All labs were reviewed. She has profound iron deficiency. Likely due to menorrhagia. Is intolerant to oral iron and is symptomatic. Will arrange for IV Injectafer x2. I discussed this potential side effects with her. 2) Obesity 3) Thrombocytosis Likely reactive to #1 4) Menorrhagia Due to fibroids. To follow-up with GYN 
 
5) Health maintenance Strongly encouraged her to get a screening colonoscopy. She states that she has numbers to call Plan:  
 
· Injectafer x2 · Take a multivitamin a day · Follow-up with GI and · Colonoscopy · Return to clinic in 3 months with repeat iron studies and CBC I appreciate the opportunity to participate in [facility].  
 
Signed By: Veronica Sheppard MD

## 2018-11-01 NOTE — PROGRESS NOTES
Gail Pang is a 48 y.o. female here today as a new patient, anemia. Patient states not tolerating oral iron supplement well, upsets stomach. Feeling fatigued.

## 2018-11-08 ENCOUNTER — HOSPITAL ENCOUNTER (OUTPATIENT)
Dept: INFUSION THERAPY | Age: 53
Discharge: HOME | End: 2018-11-08
Payer: COMMERCIAL

## 2018-11-08 VITALS
HEART RATE: 75 BPM | SYSTOLIC BLOOD PRESSURE: 123 MMHG | RESPIRATION RATE: 16 BRPM | TEMPERATURE: 98.1 F | DIASTOLIC BLOOD PRESSURE: 80 MMHG

## 2018-11-08 DIAGNOSIS — D50.0 IRON DEFICIENCY ANEMIA DUE TO CHRONIC BLOOD LOSS: Primary | ICD-10-CM

## 2018-11-08 PROCEDURE — 96365 THER/PROPH/DIAG IV INF INIT: CPT

## 2018-11-08 PROCEDURE — 74011250636 HC RX REV CODE- 250/636: Performed by: REGISTERED NURSE

## 2018-11-08 RX ADMIN — FERRIC CARBOXYMALTOSE INJECTION 750 MG: 50 INJECTION, SOLUTION INTRAVENOUS at 15:30

## 2018-11-08 NOTE — PROGRESS NOTES
Outpatient Infusion [facility] Progress Note 1500 Pt admit to [facility] for Injectafer 1/2 ambulatory in stable condition. Assessment completed. No new concerns voiced. PIV established in right AC, flushed with positve blood return and connected to Elliott Ackerman. Education provided on new infusion/side effects, pt states understanding. Patient Vitals for the past 12 hrs: 
 Temp Pulse Resp BP  
11/08/18 1600 98.1 °F (36.7 °C) 75 16 123/80  
11/08/18 1515 98.2 °F (36.8 °C) 76 16 141/81 Medications: 
[facility] Injectafer 1615 Pt tolerated treatment well. Pt observed post infusion with no s/s of reaction. PIV removed at conclusion. D/c home ambulatory in no distress. Pt aware of next appointment scheduled for 11/15/18 for 2/2.

## 2019-01-28 ENCOUNTER — TELEPHONE (OUTPATIENT)
Dept: ONCOLOGY | Age: 54
End: 2019-01-28

## 2019-01-28 NOTE — TELEPHONE ENCOUNTER
Call to patient. LM to remind to obtain lab work prior to appointment 2/1  Please call with any questions or need to reschedule.

## 2019-02-01 DIAGNOSIS — D50.0 IRON DEFICIENCY ANEMIA DUE TO CHRONIC BLOOD LOSS: ICD-10-CM
